# Patient Record
Sex: FEMALE | Race: BLACK OR AFRICAN AMERICAN | NOT HISPANIC OR LATINO | Employment: UNEMPLOYED | URBAN - METROPOLITAN AREA
[De-identification: names, ages, dates, MRNs, and addresses within clinical notes are randomized per-mention and may not be internally consistent; named-entity substitution may affect disease eponyms.]

---

## 2019-09-02 ENCOUNTER — HOSPITAL ENCOUNTER (EMERGENCY)
Facility: HOSPITAL | Age: 59
Discharge: HOME | End: 2019-09-02
Attending: EMERGENCY MEDICINE
Payer: MEDICAID

## 2019-09-02 VITALS
BODY MASS INDEX: 31.15 KG/M2 | HEIGHT: 61 IN | RESPIRATION RATE: 19 BRPM | WEIGHT: 165 LBS | TEMPERATURE: 98 F | HEART RATE: 72 BPM | SYSTOLIC BLOOD PRESSURE: 183 MMHG | DIASTOLIC BLOOD PRESSURE: 90 MMHG | OXYGEN SATURATION: 99 %

## 2019-09-02 DIAGNOSIS — H11.32 SUBCONJUNCTIVAL HEMORRHAGE OF LEFT EYE: Primary | ICD-10-CM

## 2019-09-02 PROCEDURE — 99283 EMERGENCY DEPT VISIT LOW MDM: CPT

## 2019-09-02 PROCEDURE — 25000003 PHARM REV CODE 250: Performed by: PHYSICIAN ASSISTANT

## 2019-09-02 RX ORDER — TETRACAINE HYDROCHLORIDE 5 MG/ML
2 SOLUTION OPHTHALMIC
Status: COMPLETED | OUTPATIENT
Start: 2019-09-02 | End: 2019-09-02

## 2019-09-02 RX ADMIN — FLUORESCEIN SODIUM 1 EACH: 1 STRIP OPHTHALMIC at 03:09

## 2019-09-02 RX ADMIN — TETRACAINE HYDROCHLORIDE 2 DROP: 5 SOLUTION OPHTHALMIC at 03:09

## 2019-09-02 NOTE — ED TRIAGE NOTES
The pt states she has a runny nose that started last Wednesday night. Reports she noticed her left eye was red last night.

## 2019-09-02 NOTE — ED PROVIDER NOTES
"Encounter Date: 9/2/2019    SCRIBE #1 NOTE: I, Marisela Montgomery, am scribing for, and in the presence of,  SUSAN Hennessy. I have scribed the following portions of the note - Other sections scribed: HPI, ROS.       History     Chief Complaint   Patient presents with    Eye Problem     pt with c/o redness L eye. + redness to L eye. pt denies pain. no swelling noted. pt states it happened last night. pt denies headache, blurred vision. nadn. vss.      Chief Complaint: Eye Redness  History of Present Illness: History obtained from patient. This 59 y.o. female with a PMHx of Hypertension presents to the ED complaining of left eye redness that began last night. She reports that "it feels like something is in it". She notes experiencing congestion and rhinorrhea since 8/29/2019. She reports that she cleaned up a quiroz yesterday. She states that she wears reading glasses and denies using contact lenses. She denies any recent eye injury or trauma. She denies any recent swimming. She denies taking any medications. She denies any allergies to medications. She denies any past surgeries. She denies tobacco, alcohol,and drug use. Denies eye pain, eye itching, eye discharge, visual disturbance, headache, dizziness, and weakness. No other associated symptoms.    The history is provided by the patient. No  was used.     Review of patient's allergies indicates:  No Known Allergies  Past Medical History:   Diagnosis Date    Hypertension      History reviewed. No pertinent surgical history.  History reviewed. No pertinent family history.  Social History     Tobacco Use    Smoking status: Never Smoker    Smokeless tobacco: Never Used   Substance Use Topics    Alcohol use: Not Currently    Drug use: Never     Review of Systems   Constitutional: Negative for fever.   HENT: Positive for congestion and rhinorrhea. Negative for sore throat.    Eyes: Positive for redness. Negative for pain, discharge, itching and " visual disturbance.   Respiratory: Negative for shortness of breath.    Cardiovascular: Negative for chest pain.   Gastrointestinal: Negative for nausea.   Genitourinary: Negative for dysuria.   Musculoskeletal: Negative for back pain.   Skin: Negative for rash.   Neurological: Negative for dizziness, weakness and headaches.   Hematological: Does not bruise/bleed easily.       Physical Exam     Initial Vitals [09/02/19 1438]   BP Pulse Resp Temp SpO2   (!) 198/101 88 18 98.3 °F (36.8 °C) 100 %      MAP       --         Physical Exam    Nursing note and vitals reviewed.  Constitutional: She appears well-developed and well-nourished. No distress.   HENT:   Head: Normocephalic and atraumatic.   Nose: Nose normal.   Mouth/Throat: Oropharynx is clear and moist.   Eyes: EOM are normal. Pupils are equal, round, and reactive to light. Lids are everted and swept, no foreign bodies found. Right eye exhibits no chemosis, no discharge and no exudate. Left eye exhibits no chemosis, no discharge and no exudate. Right conjunctiva is not injected. Right conjunctiva has no hemorrhage. Left conjunctiva is not injected. Left conjunctiva has a hemorrhage.   Slit lamp exam:       The left eye shows no corneal abrasion, no corneal flare, no corneal ulcer, no hyphema, no hypopyon, no fluorescein uptake and no anterior chamber bulge.       Neck: Normal range of motion. Neck supple.   Cardiovascular: Normal rate, regular rhythm and normal heart sounds. Exam reveals no gallop and no friction rub.    No murmur heard.  Pulmonary/Chest: Breath sounds normal. No respiratory distress. She has no wheezes. She has no rhonchi. She has no rales.   Abdominal: Soft. Bowel sounds are normal. There is no tenderness. There is no rebound and no guarding.   Musculoskeletal: Normal range of motion.   Neurological: She is alert and oriented to person, place, and time. She has normal strength. No cranial nerve deficit or sensory deficit.   Skin: Skin is warm  and dry. Capillary refill takes less than 2 seconds.   Psychiatric: She has a normal mood and affect.         ED Course   Procedures  Labs Reviewed - No data to display       Imaging Results    None          Medical Decision Making:   ED Management:  This is an evaluation of a 59 y.o. female that presents to the Emergency Department for left eye redness. Physical Exam shows a non-toxic, afebrile, and well appearing female. PERRL. EOM's intact and without pain.  Visual Acuity OD 20/40, OS 20/40. There is no proptosis, scleral icterus , erythema or increased warmth in periorbital area.  There is a subconjunctival hemorrhage lateral aspect of the left.  Hemorrhage is not cross the limbus.  No hyphema.  There is no conjunctival injection. No findings of open globe injury. There is no dendritic lesions, facial rash and a negative Perez's sign. Negative Nury sign. After topical anesthetic, pain improved.  Intra-ocular pressure 14.    Vital Signs Are Reassuring. If available, previous records reviewed.       My overall impression is subconjunctival hemorrhage. I considered, but at this time, do not suspect conjunctivitis, iritis, acute angle closure glaucoma, orbital or preseptal cellulitis, herpetic involvement, open globe injury.     The diagnosis, treatment plan, instructions for follow-up and reevaluation with opthalmology as well as ED return precautions were discussed and understanding was verbalized. All questions or concerns have been addressed.                 Scribe Attestation:   Scribe #1: I performed the above scribed service and the documentation accurately describes the services I performed. I attest to the accuracy of the note.    Attending Attestation:           Physician Attestation for Scribe:  Physician Attestation Statement for Scribe #1: I, SUSAN Hennessy, reviewed documentation, as scribed by Marisela Montgomery in my presence, and it is both accurate and complete.                    Clinical  Impression:       ICD-10-CM ICD-9-CM   1. Subconjunctival hemorrhage of left eye H11.32 372.72            Scribe attestation: I, Octaviano Guevara , personally performed the services described in this documentation. All medical record entries made by the scribe were at my direction and in my presence. I have reviewed the chart and agree that the record reflects my personal performance and is accurate and complete                    Octaviano Guevara PA-C  09/02/19 0278

## 2020-02-26 ENCOUNTER — HOSPITAL ENCOUNTER (EMERGENCY)
Facility: HOSPITAL | Age: 60
Discharge: HOME | End: 2020-02-26
Attending: EMERGENCY MEDICINE
Payer: MEDICAID

## 2020-02-26 VITALS
DIASTOLIC BLOOD PRESSURE: 70 MMHG | BODY MASS INDEX: 33.13 KG/M2 | SYSTOLIC BLOOD PRESSURE: 129 MMHG | HEIGHT: 62 IN | WEIGHT: 180 LBS | OXYGEN SATURATION: 99 % | HEART RATE: 65 BPM | TEMPERATURE: 98 F | RESPIRATION RATE: 18 BRPM

## 2020-02-26 DIAGNOSIS — S39.012A STRAIN OF LUMBAR REGION, INITIAL ENCOUNTER: Primary | ICD-10-CM

## 2020-02-26 PROCEDURE — 99284 EMERGENCY DEPT VISIT MOD MDM: CPT

## 2020-02-26 PROCEDURE — 25000003 PHARM REV CODE 250: Performed by: PHYSICIAN ASSISTANT

## 2020-02-26 RX ORDER — LIDOCAINE 50 MG/G
1 PATCH TOPICAL
Status: DISCONTINUED | OUTPATIENT
Start: 2020-02-26 | End: 2020-02-26 | Stop reason: HOSPADM

## 2020-02-26 RX ORDER — NAPROXEN 500 MG/1
500 TABLET ORAL
Status: COMPLETED | OUTPATIENT
Start: 2020-02-26 | End: 2020-02-26

## 2020-02-26 RX ORDER — METHOCARBAMOL 500 MG/1
1000 TABLET, FILM COATED ORAL 3 TIMES DAILY
Qty: 30 TABLET | Refills: 0 | Status: SHIPPED | OUTPATIENT
Start: 2020-02-26 | End: 2020-03-02

## 2020-02-26 RX ORDER — LIDOCAINE 50 MG/G
1 PATCH TOPICAL DAILY
Qty: 15 PATCH | Refills: 0 | Status: SHIPPED | OUTPATIENT
Start: 2020-02-26

## 2020-02-26 RX ORDER — NAPROXEN 500 MG/1
500 TABLET ORAL 2 TIMES DAILY WITH MEALS
Qty: 10 TABLET | Refills: 0 | Status: SHIPPED | OUTPATIENT
Start: 2020-02-26

## 2020-02-26 RX ADMIN — LIDOCAINE 1 PATCH: 50 PATCH TOPICAL at 01:02

## 2020-02-26 RX ADMIN — NAPROXEN 500 MG: 500 TABLET ORAL at 01:02

## 2020-02-26 NOTE — DISCHARGE INSTRUCTIONS
Apply lidocaine patch to area of pain for 12 hr and remove for 12 hr; only 1 patch in 24 hr.  Take naproxen with meals twice daily for 5 days.  Take Robaxin up to 3 times daily as needed for muscle spasms.  Avoid driving, drinking alcohol, or operating heavy machinery when taking Robaxin as it can make you sleepy.  Alternate ice and heat compresses to area of pain for 15 min every 1-2 hours.  Follow-up with your PCP for further evaluation if symptoms persist.  Return to the ED for any concerning symptoms.      Our goal in the emergency department is to always give you outstanding care and exceptional service. You may receive a survey by mail or e-mail in the next week regarding your experience in our ED. We would greatly appreciate your completing and returning the survey. Your feedback provides us with a way to recognize our staff who give very good care and it helps us learn how to improve when your experience was below our aspiration of excellence.

## 2020-02-26 NOTE — ED PROVIDER NOTES
Encounter Date: 2/26/2020       History     Chief Complaint   Patient presents with    Back Pain     Pt presenting with back pain right lower @ buttock, for a couple of weeks, states that it gets worse with sitting then standing. Pt took Tylenol this am and rates pain 8/10.     Patient is a 59-yo AAF with PMHx of HTN who presents to the ED for urgent evaluation of non-radiating, right lower back pain starting a few days ago. States pain is made worse when transitioning from sitting to standing. Denies recent fall or trauma and cannot recall if she has lifted a heavy object. She does mention that she is in a [employer] dance group and danced for 2 parades within the past few days. She has tried OTC Tylenol with no relief. She denies HA, chest pain, SOB, abdominal pain, dysuria, incontinence, numbness or tingling.     The history is provided by the patient.     Review of patient's allergies indicates:  No Known Allergies  Past Medical History:   Diagnosis Date    Hypertension      History reviewed. No pertinent surgical history.  No family history on file.  Social History     Tobacco Use    Smoking status: Never Smoker    Smokeless tobacco: Never Used   Substance Use Topics    Alcohol use: Not Currently    Drug use: Never     Review of Systems   Constitutional: Negative for chills and fever.   HENT: Negative for sore throat.    Respiratory: Negative for shortness of breath.    Cardiovascular: Negative for chest pain.   Gastrointestinal: Negative for abdominal pain and nausea.   Genitourinary: Negative for dysuria.   Musculoskeletal: Positive for back pain. Negative for gait problem.   Skin: Negative for rash.   Neurological: Negative for weakness and numbness.   Hematological: Does not bruise/bleed easily.   Psychiatric/Behavioral: Negative for dysphoric mood.       Physical Exam     Initial Vitals [02/26/20 1214]   BP Pulse Resp Temp SpO2   (!) 159/94 83 16 98.1 °F (36.7 °C) 98 %      MAP       --          Physical Exam    Nursing note and vitals reviewed.  Constitutional: She appears well-developed and well-nourished. She is not diaphoretic. No distress.   HENT:   Head: Normocephalic and atraumatic.   Eyes: Conjunctivae and EOM are normal. Pupils are equal, round, and reactive to light.   Neck: Normal range of motion. Neck supple.   Cardiovascular: Normal rate, regular rhythm, normal heart sounds and intact distal pulses.   Pulmonary/Chest: Breath sounds normal. No respiratory distress. She has no wheezes. She has no rales.   Musculoskeletal: Normal range of motion. She exhibits tenderness. She exhibits no edema.   Mild TTP to the right lumbar paraspinal muscles. No TTP overlying spinous process.    Neurological: She is alert and oriented to person, place, and time. She has normal strength. GCS score is 15. GCS eye subscore is 4. GCS verbal subscore is 5. GCS motor subscore is 6.   FROM and strength of BLE's. Sensation intact. Normal gait.   Skin: Skin is warm and dry.   Psychiatric: She has a normal mood and affect. Thought content normal.         ED Course   Procedures  Labs Reviewed - No data to display       Imaging Results    None                APC / Resident Notes:   59 year old patient presenting 2/2 back pain most consistent with musculoskeletal strain vs sciatica.  Patient was given Lidocaine patch and Naproxen for pain and discharged with Lidocaine patches, Naproxen and Robaxin.     Low suspicion for acute cord compression or cauda equina at this time, given presentation and symptoms, including epidural abscess or hematoma. Patient has no history of malignancy, active or distant history.  Patient has no unexplained weight loss. No recent fevers, rigors, malaise, or recent infection.  No history of IVDU or skin-popping.  Patient does not have any history concerning for saddle anesthesia/perianal sensory loss or complaining of decreased rectal tone. Patient does not have urinary retention or inability to  control urine from overflow.  Patient has no tenderness overlying spinous process. Patient has no focal weakness on examination.  Given exam and history, low suspicion for cord compression, cauda equina, epidural abscess/hematoma. Distally neurovascuarly intact. Query likely musculoskeletal component versus sciatica. Discussed pain control, physical therapy and follow up with PMD. Cautious return precautions discussed w/ full understanding.                                Clinical Impression:       ICD-10-CM ICD-9-CM   1. Strain of lumbar region, initial encounter S39.012A 847.2         Disposition:   Disposition: Discharged  Condition: Stable                        Fiona Calvert PA-C  02/26/20 [id number]

## 2020-02-26 NOTE — ED TRIAGE NOTES
Pt complains lower right sided back pain x3 days worsening on yesterday.  Pt took tylenol with minimal relief.  Pt denies urinary symptoms, vaginal discharge, vaginal bleeding and abdominal pain.

## 2021-01-20 ENCOUNTER — CLINICAL SUPPORT (OUTPATIENT)
Dept: URGENT CARE | Facility: CLINIC | Age: 61
End: 2021-01-20
Payer: MEDICAID

## 2021-01-20 DIAGNOSIS — Z01.812 ENCOUNTER FOR SCREENING LABORATORY TESTING FOR COVID-19 VIRUS IN ASYMPTOMATIC PATIENT: Primary | ICD-10-CM

## 2021-01-20 DIAGNOSIS — Z11.52 ENCOUNTER FOR SCREENING LABORATORY TESTING FOR COVID-19 VIRUS IN ASYMPTOMATIC PATIENT: Primary | ICD-10-CM

## 2021-01-20 LAB
CTP QC/QA: YES
SARS-COV-2 RDRP RESP QL NAA+PROBE: NEGATIVE

## 2021-01-20 PROCEDURE — 87635: ICD-10-PCS | Mod: QW,S$GLB,, | Performed by: NURSE PRACTITIONER

## 2021-01-20 PROCEDURE — 87635 SARS-COV-2 COVID-19 AMP PRB: CPT | Mod: QW,S$GLB,, | Performed by: NURSE PRACTITIONER
